# Patient Record
Sex: FEMALE | Race: WHITE
[De-identification: names, ages, dates, MRNs, and addresses within clinical notes are randomized per-mention and may not be internally consistent; named-entity substitution may affect disease eponyms.]

---

## 2018-03-29 ENCOUNTER — HOSPITAL ENCOUNTER (EMERGENCY)
Dept: HOSPITAL 25 - ED | Age: 23
Discharge: HOME | End: 2018-03-29
Payer: COMMERCIAL

## 2018-03-29 DIAGNOSIS — R55: Primary | ICD-10-CM

## 2018-03-29 LAB
BASOPHILS # BLD AUTO: 0.1 10^3/UL (ref 0–0.2)
EOSINOPHIL # BLD AUTO: 0.2 10^3/UL (ref 0–0.6)
HCT VFR BLD AUTO: 36 % (ref 35–47)
HGB BLD-MCNC: 12 G/DL (ref 12–16)
INR PPP/BLD: 1.01 (ref 0.77–1.02)
LYMPHOCYTES # BLD AUTO: 2.4 10^3/UL (ref 1–4.8)
MCH RBC QN AUTO: 30 PG (ref 27–31)
MCHC RBC AUTO-ENTMCNC: 34 G/DL (ref 31–36)
MCV RBC AUTO: 88 FL (ref 80–97)
MONOCYTES # BLD AUTO: 0.3 10^3/UL (ref 0–0.8)
NEUTROPHILS # BLD AUTO: 3.2 10^3/UL (ref 1.5–7.7)
NRBC # BLD AUTO: 0 10^3/UL
NRBC BLD QL AUTO: 0
PLATELET # BLD AUTO: 203 10^3/UL (ref 150–450)
RBC # BLD AUTO: 4.05 10^6/UL (ref 4–5.4)
WBC # BLD AUTO: 6.2 10^3/UL (ref 3.5–10.8)

## 2018-03-29 PROCEDURE — 36415 COLL VENOUS BLD VENIPUNCTURE: CPT

## 2018-03-29 PROCEDURE — 85025 COMPLETE CBC W/AUTO DIFF WBC: CPT

## 2018-03-29 PROCEDURE — 70450 CT HEAD/BRAIN W/O DYE: CPT

## 2018-03-29 PROCEDURE — 84484 ASSAY OF TROPONIN QUANT: CPT

## 2018-03-29 PROCEDURE — 83540 ASSAY OF IRON: CPT

## 2018-03-29 PROCEDURE — 84702 CHORIONIC GONADOTROPIN TEST: CPT

## 2018-03-29 PROCEDURE — 83605 ASSAY OF LACTIC ACID: CPT

## 2018-03-29 PROCEDURE — 99282 EMERGENCY DEPT VISIT SF MDM: CPT

## 2018-03-29 PROCEDURE — 82607 VITAMIN B-12: CPT

## 2018-03-29 PROCEDURE — 82746 ASSAY OF FOLIC ACID SERUM: CPT

## 2018-03-29 PROCEDURE — 80053 COMPREHEN METABOLIC PANEL: CPT

## 2018-03-29 PROCEDURE — 85610 PROTHROMBIN TIME: CPT

## 2018-03-29 PROCEDURE — 83550 IRON BINDING TEST: CPT
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
## 2018-03-29 NOTE — RAD
INDICATION:  Episodes of vision loss becoming more frequent. 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



COMPARISON: None.



TECHNIQUE: Contiguous axial sections of the brain were obtained from the skull base to the

vertex without contrast.



FINDINGS: 



The ventricles, cisterns and sulci are within normal limits.  



    The gray-white matter differentiation is adequately maintained and there is no sulcal

effacement. No significant focal abnormality or mass effect is present. 



There is no evidence for intracranial hemorrhage.



No significant focal osseous abnormality is present. 



The visualized portion of the paranasal sinuses appear clear.



The mastoid air cells are well aerated bilaterally.



IMPRESSION:  Normal CT of the brain.